# Patient Record
Sex: MALE | Race: BLACK OR AFRICAN AMERICAN | NOT HISPANIC OR LATINO
[De-identification: names, ages, dates, MRNs, and addresses within clinical notes are randomized per-mention and may not be internally consistent; named-entity substitution may affect disease eponyms.]

---

## 2018-03-23 ENCOUNTER — APPOINTMENT (OUTPATIENT)
Dept: UROLOGY | Facility: CLINIC | Age: 63
End: 2018-03-23
Payer: COMMERCIAL

## 2018-03-23 VITALS
WEIGHT: 220 LBS | DIASTOLIC BLOOD PRESSURE: 94 MMHG | HEART RATE: 70 BPM | TEMPERATURE: 97.9 F | HEIGHT: 73 IN | OXYGEN SATURATION: 97 % | BODY MASS INDEX: 29.16 KG/M2 | SYSTOLIC BLOOD PRESSURE: 147 MMHG

## 2018-03-23 VITALS — DIASTOLIC BLOOD PRESSURE: 95 MMHG | SYSTOLIC BLOOD PRESSURE: 163 MMHG

## 2018-03-23 DIAGNOSIS — Z80.9 FAMILY HISTORY OF MALIGNANT NEOPLASM, UNSPECIFIED: ICD-10-CM

## 2018-03-23 DIAGNOSIS — Z80.42 FAMILY HISTORY OF MALIGNANT NEOPLASM OF PROSTATE: ICD-10-CM

## 2018-03-23 DIAGNOSIS — K21.9 GASTRO-ESOPHAGEAL REFLUX DISEASE W/OUT ESOPHAGITIS: ICD-10-CM

## 2018-03-23 DIAGNOSIS — I10 ESSENTIAL (PRIMARY) HYPERTENSION: ICD-10-CM

## 2018-03-23 DIAGNOSIS — Z78.9 OTHER SPECIFIED HEALTH STATUS: ICD-10-CM

## 2018-03-23 PROCEDURE — 51798 US URINE CAPACITY MEASURE: CPT

## 2018-03-23 PROCEDURE — 99204 OFFICE O/P NEW MOD 45 MIN: CPT

## 2018-03-23 RX ORDER — ESOMEPRAZOLE MAGNESIUM 40 MG/1
40 CAPSULE, DELAYED RELEASE ORAL
Qty: 90 | Refills: 0 | Status: ACTIVE | COMMUNITY
Start: 2017-07-13

## 2018-03-23 RX ORDER — AMLODIPINE BESYLATE AND BENAZEPRIL HYDROCHLORIDE 10; 40 MG/1; MG/1
CAPSULE ORAL
Refills: 0 | Status: ACTIVE | COMMUNITY

## 2018-03-23 RX ORDER — HYDRALAZINE HYDROCHLORIDE 50 MG/1
TABLET ORAL
Refills: 0 | Status: ACTIVE | COMMUNITY

## 2018-04-24 ENCOUNTER — OUTPATIENT (OUTPATIENT)
Dept: OUTPATIENT SERVICES | Facility: HOSPITAL | Age: 63
LOS: 1 days | End: 2018-04-24
Payer: MEDICARE

## 2018-04-24 ENCOUNTER — APPOINTMENT (OUTPATIENT)
Dept: MRI IMAGING | Facility: IMAGING CENTER | Age: 63
End: 2018-04-24
Payer: MEDICARE

## 2018-04-24 DIAGNOSIS — R97.20 ELEVATED PROSTATE SPECIFIC ANTIGEN [PSA]: ICD-10-CM

## 2018-04-24 PROCEDURE — 72197 MRI PELVIS W/O & W/DYE: CPT

## 2018-04-24 PROCEDURE — A9585: CPT

## 2018-04-24 PROCEDURE — 82565 ASSAY OF CREATININE: CPT

## 2018-04-24 PROCEDURE — 72197 MRI PELVIS W/O & W/DYE: CPT | Mod: 26

## 2018-04-27 ENCOUNTER — APPOINTMENT (OUTPATIENT)
Dept: UROLOGY | Facility: CLINIC | Age: 63
End: 2018-04-27
Payer: MEDICARE

## 2018-04-27 VITALS
TEMPERATURE: 97.9 F | DIASTOLIC BLOOD PRESSURE: 84 MMHG | WEIGHT: 217.31 LBS | HEART RATE: 65 BPM | HEIGHT: 73 IN | SYSTOLIC BLOOD PRESSURE: 153 MMHG | BODY MASS INDEX: 28.8 KG/M2 | OXYGEN SATURATION: 97 %

## 2018-04-27 PROCEDURE — 99214 OFFICE O/P EST MOD 30 MIN: CPT

## 2018-04-27 RX ORDER — TADALAFIL 20 MG/1
20 TABLET, FILM COATED ORAL
Qty: 6 | Refills: 11 | Status: DISCONTINUED | COMMUNITY
Start: 2018-03-23 | End: 2018-04-27

## 2018-05-09 ENCOUNTER — APPOINTMENT (OUTPATIENT)
Dept: UROLOGY | Facility: CLINIC | Age: 63
End: 2018-05-09
Payer: COMMERCIAL

## 2018-05-09 PROCEDURE — 99214 OFFICE O/P EST MOD 30 MIN: CPT

## 2018-05-31 ENCOUNTER — APPOINTMENT (OUTPATIENT)
Dept: UROLOGY | Facility: CLINIC | Age: 63
End: 2018-05-31

## 2018-06-07 ENCOUNTER — OUTPATIENT (OUTPATIENT)
Dept: OUTPATIENT SERVICES | Facility: HOSPITAL | Age: 63
LOS: 1 days | End: 2018-06-07
Payer: MEDICARE

## 2018-06-07 ENCOUNTER — APPOINTMENT (OUTPATIENT)
Dept: UROLOGY | Facility: CLINIC | Age: 63
End: 2018-06-07
Payer: COMMERCIAL

## 2018-06-07 ENCOUNTER — LABORATORY RESULT (OUTPATIENT)
Age: 63
End: 2018-06-07

## 2018-06-07 DIAGNOSIS — R35.0 FREQUENCY OF MICTURITION: ICD-10-CM

## 2018-06-07 PROCEDURE — 76942 ECHO GUIDE FOR BIOPSY: CPT | Mod: 26,59

## 2018-06-07 PROCEDURE — 55700: CPT

## 2018-06-07 PROCEDURE — 76942 ECHO GUIDE FOR BIOPSY: CPT | Mod: 59

## 2018-06-07 PROCEDURE — 76872 US TRANSRECTAL: CPT

## 2018-06-07 PROCEDURE — 76872 US TRANSRECTAL: CPT | Mod: 26

## 2018-06-13 RX ORDER — CEFDINIR 300 MG/1
300 CAPSULE ORAL
Qty: 2 | Refills: 0 | Status: COMPLETED | COMMUNITY
Start: 2018-05-09 | End: 2018-06-14

## 2018-06-15 DIAGNOSIS — R97.20 ELEVATED PROSTATE SPECIFIC ANTIGEN [PSA]: ICD-10-CM

## 2018-10-19 ENCOUNTER — MEDICATION RENEWAL (OUTPATIENT)
Age: 63
End: 2018-10-19

## 2019-05-02 ENCOUNTER — RX CHANGE (OUTPATIENT)
Age: 64
End: 2019-05-02

## 2019-05-03 ENCOUNTER — RX RENEWAL (OUTPATIENT)
Age: 64
End: 2019-05-03

## 2019-05-24 ENCOUNTER — APPOINTMENT (OUTPATIENT)
Dept: UROLOGY | Facility: CLINIC | Age: 64
End: 2019-05-24
Payer: MEDICARE

## 2019-05-24 VITALS
HEART RATE: 56 BPM | SYSTOLIC BLOOD PRESSURE: 145 MMHG | TEMPERATURE: 98 F | DIASTOLIC BLOOD PRESSURE: 79 MMHG | WEIGHT: 210 LBS | HEIGHT: 73 IN | BODY MASS INDEX: 27.83 KG/M2 | OXYGEN SATURATION: 98 %

## 2019-05-24 PROCEDURE — 99214 OFFICE O/P EST MOD 30 MIN: CPT

## 2019-05-24 RX ORDER — SILDENAFIL 20 MG/1
20 TABLET ORAL DAILY
Qty: 50 | Refills: 5 | Status: DISCONTINUED | COMMUNITY
Start: 2018-04-27 | End: 2019-05-24

## 2019-05-24 RX ORDER — FINASTERIDE 1 MG/1
TABLET ORAL
Refills: 0 | Status: DISCONTINUED | COMMUNITY
End: 2019-05-24

## 2019-05-24 RX ORDER — TADALAFIL 20 MG/1
20 TABLET, FILM COATED ORAL
Refills: 0 | Status: DISCONTINUED | COMMUNITY
End: 2019-05-24

## 2019-05-28 NOTE — HISTORY OF PRESENT ILLNESS
[FreeTextEntry1] : 63 yo male presents for follow up.\par Taking Flomax, stopped Finasteride- urinating well. \par Denies dysuria, hematuria, lower abdominal or flank pain, fever, chills or rigors. \par Had MRI fusion prostate biopsy with Dr Mancilla in June 2018. Path: Benign prostatic tissue.\par Has Erectile dysfunction. Uses Sildenafil 20 mg 5 tabs- good reponse.\par \par Initially seen for Elevated PSA. Status post negative Prostate biopsy x 2 with Dr Romero, Saw Dr Mancilla with [facility] in 2013 and had MRI Prostate. \par Paternal GF had Prostate cancer. 911 Survivor. Retired . \par Denied any recent unintentional weight loss, night sweats and new bone or back pain. \par Reported reasonable stream, urinates every few hours or so during the day and no nocturia.\par Has Erectile dysfunction. Rates erections as 1/5. Reports normal libido. Takes Cialis 20 mg, partial response(3/5). \par

## 2019-05-28 NOTE — LETTER BODY
[Dear  ___] : Dear  [unfilled], [Courtesy Letter:] : I had the pleasure of seeing your patient, [unfilled], in my office today. [Please see my note below.] : Please see my note below. [Sincerely,] : Sincerely, [FreeTextEntry3] : Dash Titus MD\par  of Urology\par [facility]\par \par Offices:\par [facility] @\par [address], [place] [address]\par and\par [address], [place] [address], [address]\par and\par [address] [address]\par \par TEL: [phone]\par FAX: [phone]

## 2019-05-28 NOTE — ASSESSMENT
[FreeTextEntry1] : Benign Prostatic Hyperplasia:\par Continue Flomax.\par  \par Erectile dysfunction:\par Sildenafil 100 mg PRN.\par \par Elevated PSA:\par Has history of Elevated PSA, status post negative Prostate biopsy x 2 with Dr Romero. \par Repeat MRI Prostate(4/18): Prostate volume- 43 cc. PIRAD 3 lesion.\par MRI fusion prostate biopsy(6/18): Benign prostatic tissue.\par PSA- 6.18, Free%- 7.6(3/6/18); Corrected- 12.36(On Finasteride)?5.02(1/4/17), Corrected- 10.04. \par Has not had repeat PSA. Will get PSA with Primary care physician.\par \par Return to office in 1 year or sooner if any issues- will do Uroflo and PVR.

## 2020-05-27 ENCOUNTER — RX RENEWAL (OUTPATIENT)
Age: 65
End: 2020-05-27

## 2020-07-06 ENCOUNTER — APPOINTMENT (OUTPATIENT)
Dept: UROLOGY | Facility: CLINIC | Age: 65
End: 2020-07-06
Payer: MEDICARE

## 2020-07-06 VITALS
HEART RATE: 60 BPM | DIASTOLIC BLOOD PRESSURE: 94 MMHG | OXYGEN SATURATION: 96 % | SYSTOLIC BLOOD PRESSURE: 161 MMHG | HEIGHT: 73 IN

## 2020-07-06 PROCEDURE — 99214 OFFICE O/P EST MOD 30 MIN: CPT

## 2020-07-06 NOTE — PHYSICAL EXAM
[Urethral Meatus] : meatus normal [Penis Abnormality] : normal circumcised penis [Scrotum] : the scrotum was normal [Epididymis] : the epididymides were normal [Testes Tenderness] : no tenderness of the testes [Testes Mass (___cm)] : there were no testicular masses [General Appearance - Well Developed] : well developed [Normal Appearance] : normal appearance [Abdomen Tenderness] : non-tender [General Appearance - In No Acute Distress] : no acute distress [Abdomen Soft] : soft [Costovertebral Angle Tenderness] : no ~M costovertebral angle tenderness [Abdomen Mass (___ Cm)] : no abdominal mass palpated [Skin Color & Pigmentation] : normal skin color and pigmentation [FreeTextEntry1] : normal peripheral circulation [] : no respiratory distress [Normal Station and Gait] : the gait and station were normal for the patient's age [Oriented To Time, Place, And Person] : oriented to person, place, and time [No Focal Deficits] : no focal deficits

## 2020-07-06 NOTE — LETTER BODY
[Dear  ___] : Dear  [unfilled], [Courtesy Letter:] : I had the pleasure of seeing your patient, [unfilled], in my office today. [Sincerely,] : Sincerely, [Please see my note below.] : Please see my note below. [FreeTextEntry3] : Dash Titus MD\par  of Urology\par [facility]\par \par Offices:\par [facility] @\par [address], [place] [address]\par and\par [address] [facility], [place] [address], [address]\par and\par [address] [address]\par \par TEL: [phone]\par FAX: [phone]

## 2020-07-06 NOTE — HISTORY OF PRESENT ILLNESS
[FreeTextEntry1] : 66 yo male presents for follow up.\par Taking Flomax, off Finasteride. \par Urinates every few hours or so during the day with slow stream, nocturia 1-2 x. \par Denies dysuria, hematuria, lower abdominal or flank pain, fever, chills or rigors. \par Uses Sildenafil PRN for Erectile dysfunction. \par Per pt PSA: 7.6(July 2019) with [employer] department. \par \par Initially seen for Elevated PSA. Status post negative Prostate biopsy x 2 with Dr Romero, Saw Dr Mancilla with [facility] in 2013 and had MRI Prostate. Had MRI fusion prostate biopsy with Dr Mancilla in June 2018. Path: Benign prostatic tissue.\par Paternal GF had Prostate cancer. \par 911 Survivor. Retired . \par Denied any recent unintentional weight loss, night sweats and new bone or back pain. \par Reported reasonable stream, urinates every few hours or so during the day and no nocturia.\par Has Erectile dysfunction. Rates erections as 1/5. Reports normal libido. Takes Cialis 20 mg, partial response(3/5). \par

## 2020-07-06 NOTE — ASSESSMENT
[FreeTextEntry1] : Benign Prostatic Hyperplasia:\par Discussed treatment options mainly: continued medical management Vs Surgery: Transurethral water vapor ablation/resection/vaporization of Prostate/Simple prostatectomy- open Vs robotic after appropriate work up.\par Patient will consider his options. \par Continue Flomax. \par \par Erectile dysfunction:\par Sildenafil PRN. \par \par Elevated PSA:\par Status post negative Prostate biopsy x 2 with Dr Romero. \par MRI Prostate(4/18): Prostate volume- 43 cc. PIRAD 3 lesion.\par MRI fusion prostate biopsy(6/18) with Dr Mancilla: Benign prostatic tissue.\par PSA- 7.6(July 2019, per pt), off Finasteride<-- 6.18, Free%- 7.6(3/6/18); Corrected- 12.36(On Finasteride)?5.02(1/4/17), Corrected- 10.04. \par Will get PSA. Will inform results. \par \par Return to office in 1 year or sooner if any issues- will do Uroflo and PVR. \par \par

## 2020-07-15 LAB
PSA FREE FLD-MCNC: 13 %
PSA FREE SERPL-MCNC: 1.18 NG/ML
PSA SERPL-MCNC: 9.22 NG/ML

## 2020-08-16 ENCOUNTER — OUTPATIENT (OUTPATIENT)
Dept: OUTPATIENT SERVICES | Facility: HOSPITAL | Age: 65
LOS: 1 days | End: 2020-08-16
Payer: COMMERCIAL

## 2020-08-16 ENCOUNTER — APPOINTMENT (OUTPATIENT)
Dept: MRI IMAGING | Facility: IMAGING CENTER | Age: 65
End: 2020-08-16
Payer: MEDICARE

## 2020-08-16 ENCOUNTER — RESULT REVIEW (OUTPATIENT)
Age: 65
End: 2020-08-16

## 2020-08-16 DIAGNOSIS — R97.20 ELEVATED PROSTATE SPECIFIC ANTIGEN [PSA]: ICD-10-CM

## 2020-08-16 PROCEDURE — A9585: CPT

## 2020-08-16 PROCEDURE — 72197 MRI PELVIS W/O & W/DYE: CPT

## 2020-08-16 PROCEDURE — 76377 3D RENDER W/INTRP POSTPROCES: CPT | Mod: 26

## 2020-08-16 PROCEDURE — 72197 MRI PELVIS W/O & W/DYE: CPT | Mod: 26

## 2020-08-16 PROCEDURE — 76377 3D RENDER W/INTRP POSTPROCES: CPT

## 2021-05-06 ENCOUNTER — APPOINTMENT (OUTPATIENT)
Dept: UROLOGY | Facility: CLINIC | Age: 66
End: 2021-05-06
Payer: MEDICARE

## 2021-05-06 VITALS
SYSTOLIC BLOOD PRESSURE: 132 MMHG | HEART RATE: 69 BPM | HEIGHT: 72 IN | OXYGEN SATURATION: 98 % | DIASTOLIC BLOOD PRESSURE: 80 MMHG | BODY MASS INDEX: 27.77 KG/M2 | WEIGHT: 205 LBS

## 2021-05-06 PROCEDURE — 51741 ELECTRO-UROFLOWMETRY FIRST: CPT

## 2021-05-06 PROCEDURE — 99214 OFFICE O/P EST MOD 30 MIN: CPT | Mod: 25

## 2021-05-06 PROCEDURE — 99072 ADDL SUPL MATRL&STAF TM PHE: CPT

## 2021-05-06 NOTE — HISTORY OF PRESENT ILLNESS
[FreeTextEntry1] : 67 yo male presents for follow up.\par Taking Flomax, off Finasteride. Feels urine stream slower off Finasteride. \par Urinates every few hours or so during the day with slow stream, nocturia 1-2 x. \par Denies dysuria, hematuria, lower abdominal or flank pain, fever, chills or rigors. \par Uses Sildenafil PRN for Erectile dysfunction. \par Per Pt PSA: 7.3 in Feb 2021 with [employer] department. \par After last visit had MRI Prostate for Elevated PSA: 9.22 \par \par Initially seen for Elevated PSA. Status post negative Prostate biopsy x 2 with Dr Romero, Saw Dr Mancilla with [facility] in 2013 and had MRI Prostate. Had MRI fusion prostate biopsy with Dr Mancilla in June 2018. Path: Benign prostatic tissue.\par Paternal GF had Prostate cancer. \par 911 Survivor. Retired . \par Denied any recent unintentional weight loss, night sweats and new bone or back pain. \par Reported reasonable stream, urinates every few hours or so during the day and no nocturia.\par Has Erectile dysfunction. Rates erections as 1/5. Reports normal libido. Takes Cialis 20 mg, partial response(3/5). \par

## 2021-05-06 NOTE — LETTER BODY
[Dear  ___] : Dear  [unfilled], [Courtesy Letter:] : I had the pleasure of seeing your patient, [unfilled], in my office today. [Please see my note below.] : Please see my note below. [Sincerely,] : Sincerely, [FreeTextEntry3] : Dash Titus MD\par  of Urology\par [facility]\par \par Offices:\par [facility] @\par [address], [place] [address]\par and\par [address], [place] [address], [address]\par and\par [address] [address]\par \par TEL: [phone]\par FAX: [phone]

## 2021-05-06 NOTE — ASSESSMENT
[FreeTextEntry1] : Benign Prostatic Hyperplasia:\par See Uroflo. \par Could not do PVR check as Ultrasound device not working. \par Discussed treatment options. \par Will Continue Flomax. \par \par Erectile dysfunction:\par Continue Sildenafil PRN. \par \par Elevated PSA:\par PSA trending down. \par MRI Prostate(Aug 2020): \par Prostate volume- 70 cc. \par No MRI suspicious lesion. PI-RADS 2. \par Continue PSA monitoring. \par \par Return to office in 1 year or sooner if any issues- will do Uroflo/PVR.

## 2021-05-06 NOTE — PHYSICAL EXAM
[Normal Appearance] : normal appearance [General Appearance - In No Acute Distress] : no acute distress [Abdomen Soft] : soft [Abdomen Tenderness] : non-tender [Costovertebral Angle Tenderness] : no ~M costovertebral angle tenderness [Urethral Meatus] : meatus normal [Penis Abnormality] : normal circumcised penis [Scrotum] : the scrotum was normal [Epididymis] : the epididymides were normal [Testes Tenderness] : no tenderness of the testes [Testes Mass (___cm)] : there were no testicular masses [Skin Color & Pigmentation] : normal skin color and pigmentation [] : no respiratory distress [Oriented To Time, Place, And Person] : oriented to person, place, and time [Normal Station and Gait] : the gait and station were normal for the patient's age [No Focal Deficits] : no focal deficits [FreeTextEntry1] : normal peripheral circulation

## 2021-07-12 ENCOUNTER — APPOINTMENT (OUTPATIENT)
Dept: UROLOGY | Facility: CLINIC | Age: 66
End: 2021-07-12

## 2021-08-23 ENCOUNTER — RX RENEWAL (OUTPATIENT)
Age: 66
End: 2021-08-23

## 2023-01-05 ENCOUNTER — APPOINTMENT (OUTPATIENT)
Dept: UROLOGY | Facility: CLINIC | Age: 68
End: 2023-01-05
Payer: MEDICARE

## 2023-01-05 VITALS
HEIGHT: 73 IN | OXYGEN SATURATION: 97 % | DIASTOLIC BLOOD PRESSURE: 85 MMHG | BODY MASS INDEX: 28.36 KG/M2 | SYSTOLIC BLOOD PRESSURE: 129 MMHG | WEIGHT: 214 LBS | HEART RATE: 65 BPM

## 2023-01-05 PROCEDURE — 99214 OFFICE O/P EST MOD 30 MIN: CPT

## 2023-01-05 RX ORDER — TAMSULOSIN HYDROCHLORIDE 0.4 MG/1
0.4 CAPSULE ORAL
Qty: 90 | Refills: 3 | Status: ACTIVE | COMMUNITY
Start: 2018-04-27 | End: 1900-01-01

## 2023-01-05 NOTE — ASSESSMENT
[FreeTextEntry1] : Reviewed outside records on [facility]. \par 12/2/22:\par PSA: 8.53<--10.3(4/10/21)\par Creatinine: 1.10\par Urinalysis: Ketones 1 +\par \par Benign Prostatic Hyperplasia:\par Discussed treatment options, will continue Flomax. \par \par Erectile dysfunction:\par Discussed treatment options, will continue Sildenafil. \par \par Elevated PSA:\par PSA trended down. \par Continue PSA monitoring. Will do PSA with 911 Panel and let me know. \par \par Return to office in 6 months or sooner if any issues. \par \par

## 2023-01-05 NOTE — HISTORY OF PRESENT ILLNESS
[FreeTextEntry1] : 66 yo male presents for follow up.\par Taking Flomax. Not on Finasteride.\par Has variable stream. Urinates every 2 to 4 hours or so during the day and nocturia 1 x. \par Denies hesitancy, straining, intermittency, urgency, incontinence, sense of incomplete emptying. \par Denies dysuria, hematuria, lower abdominal or flank pain, fever, chills or rigors. \par With Sildenafil 100 mg gets good response. \par \par MRI Prostate(8/17/20):\par Prostate volume: 70 cc. \par No MRI suspicious lesion. PIRADS 2. \par \par Initially seen for Elevated PSA. Status post negative Prostate biopsy x 2 with Dr Romero, Saw Dr Mancilla with [facility] in 2013 and had MRI Prostate. Had MRI fusion prostate biopsy with Dr Mancilla in June 2018. Path: Benign prostatic tissue.\par Paternal GF had Prostate cancer. \par 911 Survivor. Retired . \par Denied any recent unintentional weight loss, night sweats and new bone or back pain. \par Reported reasonable stream, urinates every few hours or so during the day and no nocturia.\par Has Erectile dysfunction. Rates erections as 1/5. Reports normal libido. Takes Cialis 20 mg, partial response(3/5). \par

## 2023-01-05 NOTE — LETTER BODY
[Dear  ___] : Dear  [unfilled], [Courtesy Letter:] : I had the pleasure of seeing your patient, [unfilled], in my office today. [Please see my note below.] : Please see my note below. [Sincerely,] : Sincerely, [FreeTextEntry3] : Dash Titus MD\par  of Urology\par [facility]\par \par [facility]\par Offices:\par [address], [facility]\par [address] [address]\par [address], [address]\par \par TEL: [phone]\par FAX: [phone]

## 2023-01-05 NOTE — PHYSICAL EXAM
[Urethral Meatus] : meatus normal [Penis Abnormality] : normal circumcised penis [Scrotum] : the scrotum was normal [Epididymis] : the epididymides were normal [Testes Tenderness] : no tenderness of the testes [Testes Mass (___cm)] : there were no testicular masses [Normal Appearance] : normal appearance [General Appearance - In No Acute Distress] : no acute distress [Abdomen Soft] : soft [Abdomen Tenderness] : non-tender [FreeTextEntry1] : normal peripheral circulation [] : no respiratory distress [Oriented To Time, Place, And Person] : oriented to person, place, and time [Normal Station and Gait] : the gait and station were normal for the patient's age

## 2023-07-06 ENCOUNTER — APPOINTMENT (OUTPATIENT)
Dept: UROLOGY | Facility: CLINIC | Age: 68
End: 2023-07-06
Payer: MEDICARE

## 2023-07-06 VITALS
RESPIRATION RATE: 16 BRPM | DIASTOLIC BLOOD PRESSURE: 74 MMHG | BODY MASS INDEX: 26.24 KG/M2 | WEIGHT: 198 LBS | OXYGEN SATURATION: 98 % | HEART RATE: 67 BPM | SYSTOLIC BLOOD PRESSURE: 100 MMHG | HEIGHT: 73 IN

## 2023-07-06 PROCEDURE — 99213 OFFICE O/P EST LOW 20 MIN: CPT

## 2023-07-06 NOTE — LETTER BODY
[Dear  ___] : Dear  [unfilled], [Courtesy Letter:] : I had the pleasure of seeing your patient, [unfilled], in my office today. [Please see my note below.] : Please see my note below. [Sincerely,] : Sincerely, [FreeTextEntry3] : Dash Titus MD\par  of Urology\par [facility]\par \par [facility]\par Offices:\par [address], [facility]\par [address] [address]\par [address] [facility], [address]\par \par TEL: [phone]\par FAX: [phone]

## 2023-07-06 NOTE — ASSESSMENT
[FreeTextEntry1] : Reviewed records including [facility]. \par Discussed labs and imaging. \par \par 6/9/23:\par Urinalysis: Ketones trace\par Creatinine: 1.11\par PSA: 8.53(12/2/22). \par \par Benign Prostatic Hyperplasia:\par Discussed treatment options, will continue Flomax. \par \par Elevated PSA:\par Continue PSA monitoring: Total and Free PSA 1 week before next appointment with PCP. \par \par Return to office in 6 months or sooner if any issues: will do Uroflo/PVR.

## 2023-07-06 NOTE — HISTORY OF PRESENT ILLNESS
[FreeTextEntry1] : 69 yo male presents for follow up.\par Taking Flomax. Not on Finasteride.\par Has variable stream. Urinates every 2 to 4 hours or so during the day and nocturia 1 x. \par Denies hesitancy, straining, intermittency, urgency, incontinence, sense of incomplete emptying. \par Denies dysuria, hematuria, lower abdominal or flank pain, fever, chills or rigors. \par With Sildenafil 100 mg gets good response. \par \par PSA in March with [facility] was 5.83\par \par PSA: 9.22(7/15/2020).\par MRI Prostate(8/17/20):\par Prostate volume: 70 cc. \par No MRI suspicious lesion. PIRADS 2. \par \par PIERO: Jan 2023. \par \par Initially seen for Elevated PSA. Status post negative Prostate biopsy x 2 with Dr Romero, Saw Dr Mancilla with [facility] in 2013 and had MRI Prostate. Had MRI fusion prostate biopsy with Dr Mancilla in June 2018. Path: Benign prostatic tissue.\par Paternal GF had Prostate cancer. \par 911 Survivor. Retired . \par Denied any recent unintentional weight loss, night sweats and new bone or back pain. \par Reported reasonable stream, urinates every few hours or so during the day and no nocturia.\par Has Erectile dysfunction. Rates erections as 1/5. Reports normal libido. Takes Cialis 20 mg, partial response(3/5). \par

## 2023-07-06 NOTE — PHYSICAL EXAM
[Normal Appearance] : normal appearance [General Appearance - In No Acute Distress] : no acute distress [FreeTextEntry1] : normal peripheral circulation [] : no respiratory distress [Oriented To Time, Place, And Person] : oriented to person, place, and time

## 2024-01-18 ENCOUNTER — APPOINTMENT (OUTPATIENT)
Dept: UROLOGY | Facility: CLINIC | Age: 69
End: 2024-01-18
Payer: MEDICARE

## 2024-01-18 VITALS
OXYGEN SATURATION: 97 % | WEIGHT: 196.8 LBS | SYSTOLIC BLOOD PRESSURE: 153 MMHG | DIASTOLIC BLOOD PRESSURE: 86 MMHG | HEIGHT: 73 IN | HEART RATE: 57 BPM | BODY MASS INDEX: 26.08 KG/M2

## 2024-01-18 DIAGNOSIS — R97.20 ELEVATED PROSTATE, SPECIFIC ANTIGEN [PSA]: ICD-10-CM

## 2024-01-18 DIAGNOSIS — N13.8 BENIGN PROSTATIC HYPERPLASIA WITH LOWER URINARY TRACT SYMPMS: ICD-10-CM

## 2024-01-18 DIAGNOSIS — N40.1 BENIGN PROSTATIC HYPERPLASIA WITH LOWER URINARY TRACT SYMPMS: ICD-10-CM

## 2024-01-18 DIAGNOSIS — N52.9 MALE ERECTILE DYSFUNCTION, UNSPECIFIED: ICD-10-CM

## 2024-01-18 PROCEDURE — 51741 ELECTRO-UROFLOWMETRY FIRST: CPT

## 2024-01-18 PROCEDURE — 99214 OFFICE O/P EST MOD 30 MIN: CPT | Mod: 25

## 2024-01-18 RX ORDER — SILDENAFIL 100 MG/1
100 TABLET, FILM COATED ORAL
Qty: 45 | Refills: 1 | Status: ACTIVE | COMMUNITY
Start: 2019-05-24 | End: 1900-01-01

## 2024-01-23 PROBLEM — N52.9 ORGANIC ERECTILE DYSFUNCTION: Status: ACTIVE | Noted: 2018-03-23

## 2024-01-23 PROBLEM — N40.1 BPH WITH OBSTRUCTION/LOWER URINARY TRACT SYMPTOMS: Status: ACTIVE | Noted: 2018-03-23

## 2024-01-23 NOTE — END OF VISIT
[FreeTextEntry3] : Documented by Vivian Almazan acting as a scribe for Dr. Dash Titus. 01/18/2024 All medical record entries made by the Scribe were at my, Dr. Dash Titus, direction and personally dictated by me on 01/18/2024. I have reviewed the chart and agree that the record accurately reflects my personal performance of the history, physical exam, assessment and plan. I have also personally directed, reviewed, and agreed with the chart.

## 2024-01-23 NOTE — HISTORY OF PRESENT ILLNESS
[FreeTextEntry1] : Primary care physician: Dr Josué Santos, [facility].   68 years old male presents for follow up. Taking Flomax. Not on Finasteride. Has variable stream. Urinates every 2 to 4 hours or so during the day and nocturia 1 x.  Denies hesitancy, straining, intermittency, urgency, incontinence, sense of incomplete emptying.  Denies dysuria, hematuria, lower abdominal or flank pain, fever, chills or rigors.  With Sildenafil 100 mg gets good response.   PSA: 9.22 (7/15/2020). MRI Prostate (8/17/20): Prostate volume: 70 cc.  No MRI suspicious lesion. PIRADS 2.   PIERO: Today, 1/18/2024.   Initially seen for Elevated PSA. Status post negative Prostate biopsy x 2 with Dr Romero.  Saw Dr Mancilla with [facility] in 2013 and had MRI Prostate. Had MRI fusion prostate biopsy with Dr Mancilla in June 2018. Path: Benign prostatic tissue. Paternal GF had Prostate cancer.  911 Survivor. Retired .  Denied any recent unintentional weight loss, night sweats and new bone or back pain.  Reported reasonable stream, urinates every few hours or so during the day and no nocturia. Has Erectile dysfunction. Rates erections as 1/5. Reports normal libido. Takes Cialis 20 mg, partial response(3/5).

## 2024-01-23 NOTE — PHYSICAL EXAM
[Normal Appearance] : normal appearance [General Appearance - In No Acute Distress] : no acute distress [] : no respiratory distress [Oriented To Time, Place, And Person] : oriented to person, place, and time [FreeTextEntry1] : normal peripheral circulation [de-identified] : Prostate partially palpated(apex), non tender, small nodule: 0.1 cm along right apex.

## 2024-01-23 NOTE — ASSESSMENT
[FreeTextEntry1] : 01/18/2024:  Reviewed records. 12/08/2023:  creatinine: 1.05 PSA: 5.07 Urinalysis: negative  Benign prostate hyperplasia:  See uroflow and PVR.  Discussed treatment options, will continue Flomax.   Elevated PSA: PSA stable. Continue PSA monitoring.  Will get PSA before follow up appointment.   Erectile dysfunction: Renewed Sildenafil. Sent to [facility]. Recommended to use Good RX coupon.   Return to office in 6 months or sooner if any issues.

## 2024-01-23 NOTE — LETTER BODY
[Dear  ___] : Dear  [unfilled], [Courtesy Letter:] : I had the pleasure of seeing your patient, [unfilled], in my office today. [Please see my note below.] : Please see my note below. [Sincerely,] : Sincerely, [FreeTextEntry3] : Dash Titus MD\par   of Urology\par  [facility]\par  \par  [facility]\par  Offices:\par  [address] [facility]\par  [address] [address]\par  [address] [facility], [address]\par  \par  TEL: [phone]\par  FAX: [phone]

## 2024-08-29 ENCOUNTER — APPOINTMENT (OUTPATIENT)
Dept: UROLOGY | Facility: CLINIC | Age: 69
End: 2024-08-29
Payer: MEDICARE

## 2024-08-29 VITALS — DIASTOLIC BLOOD PRESSURE: 83 MMHG | SYSTOLIC BLOOD PRESSURE: 170 MMHG | HEART RATE: 73 BPM | OXYGEN SATURATION: 96 %

## 2024-08-29 DIAGNOSIS — N13.8 BENIGN PROSTATIC HYPERPLASIA WITH LOWER URINARY TRACT SYMPMS: ICD-10-CM

## 2024-08-29 DIAGNOSIS — R97.20 ELEVATED PROSTATE, SPECIFIC ANTIGEN [PSA]: ICD-10-CM

## 2024-08-29 DIAGNOSIS — N52.9 MALE ERECTILE DYSFUNCTION, UNSPECIFIED: ICD-10-CM

## 2024-08-29 DIAGNOSIS — N40.1 BENIGN PROSTATIC HYPERPLASIA WITH LOWER URINARY TRACT SYMPMS: ICD-10-CM

## 2024-08-29 PROCEDURE — 99214 OFFICE O/P EST MOD 30 MIN: CPT

## 2024-09-02 PROBLEM — R97.20 ELEVATED PROSTATE SPECIFIC ANTIGEN (PSA): Noted: 2018-03-23

## 2024-09-02 PROBLEM — N52.9 MALE ERECTILE DISORDER: Status: ACTIVE | Noted: 2024-09-02

## 2024-09-02 PROBLEM — N52.9 ORGANIC ERECTILE DYSFUNCTION: Noted: 2018-03-23

## 2024-09-02 PROBLEM — R97.20 ELEVATED PSA: Status: ACTIVE | Noted: 2024-09-02

## 2024-09-02 NOTE — END OF VISIT
[FreeTextEntry3] : This note was generated by using [employer] and Dragon medical dictation software.  A reasonable effort was made to proofread and correct its contents, but typos and mistakes may still remain. If there are any questions or points of clarification needed, please contact my office.   Prior to appointment and during encounter with patient extensive medical records were reviewed including but not limited to, hospital records, outpatient records, imaging results and lab data if available. During this appointment the patient was examined, diagnoses were discussed and explained in a face-to-face manner. In addition, extensive time was spent reviewing aforementioned diagnostic studies. Counseling including test results, differential diagnoses, treatment options, risk and benefits, lifestyle changes, current condition, and current medications was performed. Patient's questions and concerns were addressed. Patient verbalized understanding of the treatment plan. Time spent is for reviewing chart, labs and images if available, counseling and care coordination.  [Time Spent: ___ minutes] : I have spent [unfilled] minutes of time on the encounter which excludes teaching and separately reported services.

## 2024-09-02 NOTE — ASSESSMENT
[FreeTextEntry1] : Reviewed records. Discussed labs and imaging.   Benign Prostatic Hyperplasia: Discussed treatment options. Will continue Flomax.   Elevated PSA: Discussed treatment options: getting repeat MRI of prostate Vs PSA monitoring. Patient wants to continue PSA monitoring.  Will do PSA before follow-up appointment.  Recommended to abstain from sexual activity, including masturbation, for 48 hours before PSA test.  Mentioned as per comprehensive review and meta-analysis digital rectal exam exhibited a notably low diagnostic value for prostate cancer detection. With suggestion that it could be potentially omitted. Patient was agreeable with not doing digital rectal examination.   Erectile dysfunction: Will continue Sildenafil. Patient has prescription for that.   Return to office in 6 months or sooner if there are any issues. Will do uroflow and PVR.

## 2024-09-02 NOTE — HISTORY OF PRESENT ILLNESS
[FreeTextEntry1] : Primary care physician: Dr Josué Santos, [facility].   08/29/2024: 69-year-old male presents for follow-up.   Taking Flomax. Not on Finasteride since 2018.  Same urination.   Denies dysuria, hematuria, lower abdominal or flank pain, nausea, vomiting, fever, chills or rigors.  Since last visit, patient had repeat PSA with Counts include [facility] beds at the [facility] department which went up.  Had PSA repeated by PCP which trended down.   06/14/2024:  PSA: 5.49, was 5.07 on 12/08/2023.  Urinalysis: negative  Creatinine: 1.08  Uses Sildenafil and gets good response.   In the past: On Flomax. Not on Finasteride. Had variable stream. Urinated every 2 to 4 hours or so during the day and nocturia 1 x.  Denied hesitancy, straining, intermittency, urgency, incontinence, sense of incomplete emptying.  With Sildenafil 100 mg got good response.   PSA: 9.22 (7/15/2020). MRI Prostate (8/17/20): Prostate volume: 70 cc.  No MRI suspicious lesion. PIRADS 2.   Initially seen for Elevated PSA. Status post negative Prostate biopsy x 2 with Dr Romero.  Saw Dr Mancilla with [facility] in 2013 and had MRI Prostate. Had MRI fusion prostate biopsy with Dr Mancilla in June 2018. Path: Benign prostatic tissue. Paternal GF had Prostate cancer.  911 Survivor. Retired .  Denied any recent unintentional weight loss, night sweats and new bone or back pain.  Reported reasonable stream, urinates every few hours or so during the day and no nocturia. Has Erectile dysfunction. Rates erections as 1/5. Reports normal libido. Takes Cialis 20 mg, partial response (3/5).

## 2024-09-02 NOTE — PHYSICAL EXAM
[Normal Appearance] : normal appearance [General Appearance - In No Acute Distress] : no acute distress [] : no respiratory distress [Oriented To Time, Place, And Person] : oriented to person, place, and time [Abdomen Soft] : soft [Abdomen Tenderness] : non-tender [FreeTextEntry1] : normal peripheral circulation [de-identified] : Prostate partially palpated(apex), non tender, small nodule: 0.1 cm along right apex.

## 2024-09-02 NOTE — LETTER BODY
[Dear  ___] : Dear  [unfilled], [Courtesy Letter:] : I had the pleasure of seeing your patient, [unfilled], in my office today. [Please see my note below.] : Please see my note below. [Sincerely,] : Sincerely, [FreeTextEntry3] : Dash Titus MD\par   of Urology\par  [facility]\par  \par  [facility]\par  Offices:\par  [address] [facility], [facility]\par  [address] [address]\par  [address] [facility], [address]\par  \par  TEL: [phone]\par  FAX: [phone]

## 2024-09-02 NOTE — END OF VISIT
[FreeTextEntry3] : This note was generated by using [employer] and Dragon medical dictation software.  A reasonable effort was made to proofread and correct its contents, but typos and mistakes may still remain. If there are any questions or points of clarification needed, please contact my office.   Prior to appointment and during encounter with patient extensive medical records were reviewed including but not limited to, hospital records, outpatient records, imaging results and lab data if available. During this appointment the patient was examined, diagnoses were discussed and explained in a face-to-face manner. In addition, extensive time was spent reviewing aforementioned diagnostic studies. Counseling including test results, differential diagnoses, treatment options, risk and benefits, lifestyle changes, current condition, and current medications was performed. Patient's questions and concerns were addressed. Patient verbalized understanding of the treatment plan. Time spent is for reviewing chart, labs and images if available, counseling and care coordination.  [Time Spent: ___ minutes] : I have spent [unfilled] minutes of time on the encounter which excludes teaching and separately reported services.

## 2024-09-02 NOTE — ADDENDUM
[FreeTextEntry1] :  Sonali JENSEN assisted in documentation on 08/29/2024  acting as a scribe for Dr. Dash Titus.

## 2024-09-02 NOTE — PHYSICAL EXAM
[Normal Appearance] : normal appearance [General Appearance - In No Acute Distress] : no acute distress [] : no respiratory distress [Oriented To Time, Place, And Person] : oriented to person, place, and time [Abdomen Soft] : soft [Abdomen Tenderness] : non-tender [FreeTextEntry1] : normal peripheral circulation [de-identified] : Prostate partially palpated(apex), non tender, small nodule: 0.1 cm along right apex.

## 2024-09-02 NOTE — LETTER BODY
[Dear  ___] : Dear  [unfilled], [Courtesy Letter:] : I had the pleasure of seeing your patient, [unfilled], in my office today. [Please see my note below.] : Please see my note below. [Sincerely,] : Sincerely, [FreeTextEntry3] : Dash Titus MD\par   of Urology\par  [facility]\par  \par  [facility]\par  Offices:\par  [address] [facility]\par  [address] [address]\par  [address] [place] [address], [address]\par  \par  TEL: [phone]\par  FAX: [phone]

## 2024-09-02 NOTE — HISTORY OF PRESENT ILLNESS
[FreeTextEntry1] : Primary care physician: Dr Josué Santos, [facility].   08/29/2024: 69-year-old male presents for follow-up.   Taking Flomax. Not on Finasteride since 2018.  Same urination.   Denies dysuria, hematuria, lower abdominal or flank pain, nausea, vomiting, fever, chills or rigors.  Since last visit, patient had repeat PSA with [facility] department which went up.  Had PSA repeated by PCP which trended down.   06/14/2024:  PSA: 5.49, was 5.07 on 12/08/2023.  Urinalysis: negative  Creatinine: 1.08  Uses Sildenafil and gets good response.   In the past: On Flomax. Not on Finasteride. Had variable stream. Urinated every 2 to 4 hours or so during the day and nocturia 1 x.  Denied hesitancy, straining, intermittency, urgency, incontinence, sense of incomplete emptying.  With Sildenafil 100 mg got good response.   PSA: 9.22 (7/15/2020). MRI Prostate (8/17/20): Prostate volume: 70 cc.  No MRI suspicious lesion. PIRADS 2.   Initially seen for Elevated PSA. Status post negative Prostate biopsy x 2 with Dr Romero.  Saw Dr Mancilla with [facility] in 2013 and had MRI Prostate. Had MRI fusion prostate biopsy with Dr Mancilla in June 2018. Path: Benign prostatic tissue. Paternal GF had Prostate cancer.  [employer] Survivor. Retired .  Denied any recent unintentional weight loss, night sweats and new bone or back pain.  Reported reasonable stream, urinates every few hours or so during the day and no nocturia. Has Erectile dysfunction. Rates erections as 1/5. Reports normal libido. Takes Cialis 20 mg, partial response (3/5).

## 2025-02-20 ENCOUNTER — APPOINTMENT (OUTPATIENT)
Dept: UROLOGY | Facility: CLINIC | Age: 70
End: 2025-02-20

## 2025-02-27 ENCOUNTER — APPOINTMENT (OUTPATIENT)
Dept: UROLOGY | Facility: CLINIC | Age: 70
End: 2025-02-27

## 2025-02-27 VITALS
HEIGHT: 73 IN | OXYGEN SATURATION: 98 % | BODY MASS INDEX: 28.49 KG/M2 | TEMPERATURE: 97.7 F | SYSTOLIC BLOOD PRESSURE: 137 MMHG | DIASTOLIC BLOOD PRESSURE: 79 MMHG | HEART RATE: 57 BPM | WEIGHT: 215 LBS

## 2025-02-27 DIAGNOSIS — N13.8 BENIGN PROSTATIC HYPERPLASIA WITH LOWER URINARY TRACT SYMPMS: ICD-10-CM

## 2025-02-27 DIAGNOSIS — N40.1 BENIGN PROSTATIC HYPERPLASIA WITH LOWER URINARY TRACT SYMPMS: ICD-10-CM

## 2025-02-27 DIAGNOSIS — R97.20 ELEVATED PROSTATE, SPECIFIC ANTIGEN [PSA]: ICD-10-CM

## 2025-02-27 DIAGNOSIS — N52.9 MALE ERECTILE DYSFUNCTION, UNSPECIFIED: ICD-10-CM

## 2025-02-27 PROCEDURE — 51741 ELECTRO-UROFLOWMETRY FIRST: CPT

## 2025-02-27 PROCEDURE — 99214 OFFICE O/P EST MOD 30 MIN: CPT | Mod: 25

## 2025-03-04 LAB
PSA FREE FLD-MCNC: 17 %
PSA FREE SERPL-MCNC: 1.65 NG/ML
PSA SERPL-MCNC: 9.69 NG/ML

## 2025-03-09 ENCOUNTER — OUTPATIENT (OUTPATIENT)
Dept: OUTPATIENT SERVICES | Facility: HOSPITAL | Age: 70
LOS: 1 days | End: 2025-03-09
Payer: COMMERCIAL

## 2025-03-09 DIAGNOSIS — R97.20 ELEVATED PROSTATE SPECIFIC ANTIGEN [PSA]: ICD-10-CM

## 2025-03-09 PROCEDURE — 72197 MRI PELVIS W/O & W/DYE: CPT

## 2025-03-09 PROCEDURE — A9585: CPT

## 2025-03-09 PROCEDURE — 76498 UNLISTED MR PROCEDURE: CPT

## 2025-08-28 ENCOUNTER — APPOINTMENT (OUTPATIENT)
Dept: UROLOGY | Facility: CLINIC | Age: 70
End: 2025-08-28

## 2025-08-28 VITALS
HEART RATE: 61 BPM | HEIGHT: 73 IN | DIASTOLIC BLOOD PRESSURE: 87 MMHG | SYSTOLIC BLOOD PRESSURE: 147 MMHG | OXYGEN SATURATION: 95 %

## 2025-08-28 DIAGNOSIS — N52.9 MALE ERECTILE DYSFUNCTION, UNSPECIFIED: ICD-10-CM

## 2025-08-28 DIAGNOSIS — R97.20 ELEVATED PROSTATE, SPECIFIC ANTIGEN [PSA]: ICD-10-CM

## 2025-08-28 DIAGNOSIS — N13.8 BENIGN PROSTATIC HYPERPLASIA WITH LOWER URINARY TRACT SYMPMS: ICD-10-CM

## 2025-08-28 DIAGNOSIS — N40.1 BENIGN PROSTATIC HYPERPLASIA WITH LOWER URINARY TRACT SYMPMS: ICD-10-CM

## 2025-08-28 PROCEDURE — 51741 ELECTRO-UROFLOWMETRY FIRST: CPT

## 2025-08-28 PROCEDURE — 99214 OFFICE O/P EST MOD 30 MIN: CPT | Mod: 25

## 2025-08-28 PROCEDURE — 51798 US URINE CAPACITY MEASURE: CPT
